# Patient Record
Sex: MALE | Race: WHITE | NOT HISPANIC OR LATINO | URBAN - METROPOLITAN AREA
[De-identification: names, ages, dates, MRNs, and addresses within clinical notes are randomized per-mention and may not be internally consistent; named-entity substitution may affect disease eponyms.]

---

## 2024-03-20 ENCOUNTER — INPATIENT (INPATIENT)
Facility: HOSPITAL | Age: 59
LOS: 0 days | Discharge: HOME | DRG: 301 | End: 2024-03-21
Attending: INTERNAL MEDICINE | Admitting: STUDENT IN AN ORGANIZED HEALTH CARE EDUCATION/TRAINING PROGRAM
Payer: COMMERCIAL

## 2024-03-20 VITALS
OXYGEN SATURATION: 96 % | HEART RATE: 75 BPM | DIASTOLIC BLOOD PRESSURE: 80 MMHG | TEMPERATURE: 98 F | SYSTOLIC BLOOD PRESSURE: 135 MMHG | RESPIRATION RATE: 18 BRPM

## 2024-03-20 LAB
ALBUMIN SERPL ELPH-MCNC: 3.6 G/DL — SIGNIFICANT CHANGE UP (ref 3.4–5)
ALP SERPL-CCNC: 79 U/L — SIGNIFICANT CHANGE UP (ref 40–120)
ALT FLD-CCNC: 26 U/L — SIGNIFICANT CHANGE UP (ref 12–42)
ANION GAP SERPL CALC-SCNC: 8 MMOL/L — LOW (ref 9–16)
APTT BLD: 32.9 SEC — SIGNIFICANT CHANGE UP (ref 24.5–35.6)
AST SERPL-CCNC: 18 U/L — SIGNIFICANT CHANGE UP (ref 15–37)
BASOPHILS # BLD AUTO: 0.02 K/UL — SIGNIFICANT CHANGE UP (ref 0–0.2)
BASOPHILS NFR BLD AUTO: 0.3 % — SIGNIFICANT CHANGE UP (ref 0–2)
BILIRUB SERPL-MCNC: 0.9 MG/DL — SIGNIFICANT CHANGE UP (ref 0.2–1.2)
BUN SERPL-MCNC: 14 MG/DL — SIGNIFICANT CHANGE UP (ref 7–23)
CALCIUM SERPL-MCNC: 8.7 MG/DL — SIGNIFICANT CHANGE UP (ref 8.5–10.5)
CHLORIDE SERPL-SCNC: 107 MMOL/L — SIGNIFICANT CHANGE UP (ref 96–108)
CO2 SERPL-SCNC: 27 MMOL/L — SIGNIFICANT CHANGE UP (ref 22–31)
CREAT SERPL-MCNC: 0.95 MG/DL — SIGNIFICANT CHANGE UP (ref 0.5–1.3)
EGFR: 92 ML/MIN/1.73M2 — SIGNIFICANT CHANGE UP
EOSINOPHIL # BLD AUTO: 0.12 K/UL — SIGNIFICANT CHANGE UP (ref 0–0.5)
EOSINOPHIL NFR BLD AUTO: 1.7 % — SIGNIFICANT CHANGE UP (ref 0–6)
GLUCOSE SERPL-MCNC: 92 MG/DL — SIGNIFICANT CHANGE UP (ref 70–99)
HCT VFR BLD CALC: 46.3 % — SIGNIFICANT CHANGE UP (ref 39–50)
HGB BLD-MCNC: 15.4 G/DL — SIGNIFICANT CHANGE UP (ref 13–17)
IMM GRANULOCYTES NFR BLD AUTO: 0.3 % — SIGNIFICANT CHANGE UP (ref 0–0.9)
INR BLD: 2.05 — HIGH (ref 0.85–1.18)
LYMPHOCYTES # BLD AUTO: 2.16 K/UL — SIGNIFICANT CHANGE UP (ref 1–3.3)
LYMPHOCYTES # BLD AUTO: 30 % — SIGNIFICANT CHANGE UP (ref 13–44)
MCHC RBC-ENTMCNC: 31 PG — SIGNIFICANT CHANGE UP (ref 27–34)
MCHC RBC-ENTMCNC: 33.3 GM/DL — SIGNIFICANT CHANGE UP (ref 32–36)
MCV RBC AUTO: 93.2 FL — SIGNIFICANT CHANGE UP (ref 80–100)
MONOCYTES # BLD AUTO: 0.88 K/UL — SIGNIFICANT CHANGE UP (ref 0–0.9)
MONOCYTES NFR BLD AUTO: 12.2 % — SIGNIFICANT CHANGE UP (ref 2–14)
NEUTROPHILS # BLD AUTO: 4 K/UL — SIGNIFICANT CHANGE UP (ref 1.8–7.4)
NEUTROPHILS NFR BLD AUTO: 55.5 % — SIGNIFICANT CHANGE UP (ref 43–77)
NRBC # BLD: 0 /100 WBCS — SIGNIFICANT CHANGE UP (ref 0–0)
PLATELET # BLD AUTO: 164 K/UL — SIGNIFICANT CHANGE UP (ref 150–400)
POTASSIUM SERPL-MCNC: 3.9 MMOL/L — SIGNIFICANT CHANGE UP (ref 3.5–5.3)
POTASSIUM SERPL-SCNC: 3.9 MMOL/L — SIGNIFICANT CHANGE UP (ref 3.5–5.3)
PROT SERPL-MCNC: 7.8 G/DL — SIGNIFICANT CHANGE UP (ref 6.4–8.2)
PROTHROM AB SERPL-ACNC: 22.1 SEC — HIGH (ref 9.5–13)
RBC # BLD: 4.97 M/UL — SIGNIFICANT CHANGE UP (ref 4.2–5.8)
RBC # FLD: 12.6 % — SIGNIFICANT CHANGE UP (ref 10.3–14.5)
SODIUM SERPL-SCNC: 142 MMOL/L — SIGNIFICANT CHANGE UP (ref 132–145)
WBC # BLD: 7.2 K/UL — SIGNIFICANT CHANGE UP (ref 3.8–10.5)
WBC # FLD AUTO: 7.2 K/UL — SIGNIFICANT CHANGE UP (ref 3.8–10.5)

## 2024-03-20 PROCEDURE — 93971 EXTREMITY STUDY: CPT | Mod: 26,LT

## 2024-03-20 PROCEDURE — 71275 CT ANGIOGRAPHY CHEST: CPT | Mod: 26,MC

## 2024-03-20 PROCEDURE — 99285 EMERGENCY DEPT VISIT HI MDM: CPT

## 2024-03-20 PROCEDURE — 73564 X-RAY EXAM KNEE 4 OR MORE: CPT | Mod: 26,LT

## 2024-03-20 RX ORDER — WARFARIN SODIUM 2.5 MG/1
7.5 TABLET ORAL ONCE
Refills: 0 | Status: COMPLETED | OUTPATIENT
Start: 2024-03-20 | End: 2024-03-20

## 2024-03-20 RX ADMIN — WARFARIN SODIUM 7.5 MILLIGRAM(S): 2.5 TABLET ORAL at 20:15

## 2024-03-20 NOTE — ED ADULT TRIAGE NOTE - CHIEF COMPLAINT QUOTE
pt walked in complaining of pain and swelling to the right knee s/p injury. pt concerned for dvt as he has a history. compliant with warfarin therapy.

## 2024-03-20 NOTE — ED PROVIDER NOTE - PROGRESS NOTE DETAILS
spoke with pt about DVT. he states that his last DVT was in 2016 and he has not had an ultrasound since then to show interval resolution, but he does recall that his previous clot was only in his calf and not behind his knee he states.

## 2024-03-20 NOTE — ED PROVIDER NOTE - OBJECTIVE STATEMENT
59-year-old male presents today with complaint of left-sided knee pain.  Patient states that he had a trip and fall and struck his knee a couple of days ago but it has become increasingly swollen and painful behind his left knee.  He has a history of previous DVT in the left calf and PE for which she is taking Coumadin.  His INR was last checked about a week ago and he states that it was around 3.2.  Since arriving in the emergency department he states that he has developed some shortness of breath, which she is not sure whether this could be a PE or whether he is just feeling anxious about his leg.  He has not missed any doses of his Coumadin.  He denies any numbness, tingling, weakness, fever, chills, rash.  He has been wearing his compression stockings.

## 2024-03-20 NOTE — ED ADULT NURSE NOTE - NSFALLUNIVINTERV_ED_ALL_ED
Bed/Stretcher in lowest position, wheels locked, appropriate side rails in place/Call bell, personal items and telephone in reach/Instruct patient to call for assistance before getting out of bed/chair/stretcher/Non-slip footwear applied when patient is off stretcher/[place] to call system/Physically safe environment - no spills, clutter or unnecessary equipment/Purposeful proactive rounding/Room/bathroom lighting operational, light cord in reach

## 2024-03-20 NOTE — ED PROVIDER NOTE - MUSCULOSKELETAL, MLM
Spine appears normal, range of motion is not limited, + some tenderness and fullness to the popliteal fossa on the left knee, varicose veins noted, 2+ dp/pt pulses equal bilat, sensation intact distally, cap refill < 2 seconds

## 2024-03-20 NOTE — ED PROVIDER NOTE - CLINICAL SUMMARY MEDICAL DECISION MAKING FREE TEXT BOX
Patient with history of DVT and PE presents today with complaint of left calf and knee pain and swelling.  His previous DVT was in his left calf but did not include the knee.  He states the pain feels similar to his previous DVT and he does not typically have any discomfort in this area.  His last ultrasound study was in 2016 and he has been taking Coumadin ever since then.  Since arriving in the ED he states that he has experienced some shortness of breath, which was not present when he originally arrived here, and he is not sure whether he is anxious or may be has a PE as well.  Will obtain ultrasound of the extremity PE study labs and coags. Patient with history of DVT and PE presents today with complaint of left calf and knee pain and swelling.  His previous DVT was in his left calf but did not include the knee.  He states the pain feels similar to his previous DVT and he does not typically have any discomfort in this area.  His last ultrasound study was in 2016 and he has been taking Coumadin ever since then.  Since arriving in the ED he states that he has experienced some shortness of breath, which was not present when he originally arrived here, and he is not sure whether he is anxious or may be has a PE as well.  Will obtain ultrasound of the extremity PE study labs and coags.    previous DVT is from 2016 and involved only the calf vein. current US shows new DVT including both calf and popliteal veins which are exactly where pt has pain. will admit - d/w Dr. Jovanni Reddy who recommends giving his evening dose of coumadin (pt takes 7.5mg MWF and 5mg THSS) and they will recheck his INR on arrival and adjust medications as needed.

## 2024-03-21 ENCOUNTER — TRANSCRIPTION ENCOUNTER (OUTPATIENT)
Age: 59
End: 2024-03-21

## 2024-03-21 VITALS
DIASTOLIC BLOOD PRESSURE: 62 MMHG | TEMPERATURE: 98 F | HEART RATE: 50 BPM | RESPIRATION RATE: 18 BRPM | SYSTOLIC BLOOD PRESSURE: 106 MMHG | OXYGEN SATURATION: 95 %

## 2024-03-21 DIAGNOSIS — M25.562 PAIN IN LEFT KNEE: ICD-10-CM

## 2024-03-21 DIAGNOSIS — Z29.9 ENCOUNTER FOR PROPHYLACTIC MEASURES, UNSPECIFIED: ICD-10-CM

## 2024-03-21 DIAGNOSIS — I80.229 PHLEBITIS AND THROMBOPHLEBITIS OF UNSPECIFIED POPLITEAL VEIN: ICD-10-CM

## 2024-03-21 DIAGNOSIS — I82.409 ACUTE EMBOLISM AND THROMBOSIS OF UNSPECIFIED DEEP VEINS OF UNSPECIFIED LOWER EXTREMITY: ICD-10-CM

## 2024-03-21 DIAGNOSIS — Z90.49 ACQUIRED ABSENCE OF OTHER SPECIFIED PARTS OF DIGESTIVE TRACT: Chronic | ICD-10-CM

## 2024-03-21 DIAGNOSIS — F41.9 ANXIETY DISORDER, UNSPECIFIED: ICD-10-CM

## 2024-03-21 LAB
ANION GAP SERPL CALC-SCNC: 9 MMOL/L — SIGNIFICANT CHANGE UP (ref 5–17)
APTT BLD: 32.5 SEC — SIGNIFICANT CHANGE UP (ref 24.5–35.6)
BUN SERPL-MCNC: 14 MG/DL — SIGNIFICANT CHANGE UP (ref 7–23)
CALCIUM SERPL-MCNC: 9.7 MG/DL — SIGNIFICANT CHANGE UP (ref 8.4–10.5)
CHLORIDE SERPL-SCNC: 106 MMOL/L — SIGNIFICANT CHANGE UP (ref 96–108)
CO2 SERPL-SCNC: 26 MMOL/L — SIGNIFICANT CHANGE UP (ref 22–31)
CREAT SERPL-MCNC: 0.9 MG/DL — SIGNIFICANT CHANGE UP (ref 0.5–1.3)
EGFR: 98 ML/MIN/1.73M2 — SIGNIFICANT CHANGE UP
GLUCOSE SERPL-MCNC: 94 MG/DL — SIGNIFICANT CHANGE UP (ref 70–99)
HCT VFR BLD CALC: 43.4 % — SIGNIFICANT CHANGE UP (ref 39–50)
HCV AB S/CO SERPL IA: 0.04 S/CO — SIGNIFICANT CHANGE UP
HCV AB SERPL-IMP: SIGNIFICANT CHANGE UP
HGB BLD-MCNC: 14.5 G/DL — SIGNIFICANT CHANGE UP (ref 13–17)
INR BLD: 1.77 — HIGH (ref 0.85–1.18)
MAGNESIUM SERPL-MCNC: 2.2 MG/DL — SIGNIFICANT CHANGE UP (ref 1.6–2.6)
MCHC RBC-ENTMCNC: 31.3 PG — SIGNIFICANT CHANGE UP (ref 27–34)
MCHC RBC-ENTMCNC: 33.4 GM/DL — SIGNIFICANT CHANGE UP (ref 32–36)
MCV RBC AUTO: 93.5 FL — SIGNIFICANT CHANGE UP (ref 80–100)
NRBC # BLD: 0 /100 WBCS — SIGNIFICANT CHANGE UP (ref 0–0)
PHOSPHATE SERPL-MCNC: 2.9 MG/DL — SIGNIFICANT CHANGE UP (ref 2.5–4.5)
PLATELET # BLD AUTO: 167 K/UL — SIGNIFICANT CHANGE UP (ref 150–400)
POTASSIUM SERPL-MCNC: 3.8 MMOL/L — SIGNIFICANT CHANGE UP (ref 3.5–5.3)
POTASSIUM SERPL-SCNC: 3.8 MMOL/L — SIGNIFICANT CHANGE UP (ref 3.5–5.3)
PROTHROM AB SERPL-ACNC: 19.8 SEC — HIGH (ref 9.5–13)
RBC # BLD: 4.64 M/UL — SIGNIFICANT CHANGE UP (ref 4.2–5.8)
RBC # FLD: 12.7 % — SIGNIFICANT CHANGE UP (ref 10.3–14.5)
SODIUM SERPL-SCNC: 141 MMOL/L — SIGNIFICANT CHANGE UP (ref 135–145)
WBC # BLD: 6.06 K/UL — SIGNIFICANT CHANGE UP (ref 3.8–10.5)
WBC # FLD AUTO: 6.06 K/UL — SIGNIFICANT CHANGE UP (ref 3.8–10.5)

## 2024-03-21 PROCEDURE — 36415 COLL VENOUS BLD VENIPUNCTURE: CPT

## 2024-03-21 PROCEDURE — 85027 COMPLETE CBC AUTOMATED: CPT

## 2024-03-21 PROCEDURE — 73564 X-RAY EXAM KNEE 4 OR MORE: CPT

## 2024-03-21 PROCEDURE — 84100 ASSAY OF PHOSPHORUS: CPT

## 2024-03-21 PROCEDURE — 71275 CT ANGIOGRAPHY CHEST: CPT | Mod: MC

## 2024-03-21 PROCEDURE — 80053 COMPREHEN METABOLIC PANEL: CPT

## 2024-03-21 PROCEDURE — 86803 HEPATITIS C AB TEST: CPT

## 2024-03-21 PROCEDURE — 99285 EMERGENCY DEPT VISIT HI MDM: CPT

## 2024-03-21 PROCEDURE — 85025 COMPLETE CBC W/AUTO DIFF WBC: CPT

## 2024-03-21 PROCEDURE — 85610 PROTHROMBIN TIME: CPT

## 2024-03-21 PROCEDURE — 93971 EXTREMITY STUDY: CPT

## 2024-03-21 PROCEDURE — 99236 HOSP IP/OBS SAME DATE HI 85: CPT | Mod: GC

## 2024-03-21 PROCEDURE — 83735 ASSAY OF MAGNESIUM: CPT

## 2024-03-21 PROCEDURE — 85730 THROMBOPLASTIN TIME PARTIAL: CPT

## 2024-03-21 PROCEDURE — 80048 BASIC METABOLIC PNL TOTAL CA: CPT

## 2024-03-21 RX ORDER — WARFARIN SODIUM 2.5 MG/1
1 TABLET ORAL
Refills: 0 | DISCHARGE

## 2024-03-21 RX ORDER — ACETAMINOPHEN 500 MG
975 TABLET ORAL EVERY 8 HOURS
Refills: 0 | Status: DISCONTINUED | OUTPATIENT
Start: 2024-03-21 | End: 2024-03-21

## 2024-03-21 RX ORDER — APIXABAN 2.5 MG/1
5 TABLET, FILM COATED ORAL EVERY 12 HOURS
Refills: 0 | Status: DISCONTINUED | OUTPATIENT
Start: 2024-03-21 | End: 2024-03-21

## 2024-03-21 RX ORDER — APIXABAN 2.5 MG/1
1 TABLET, FILM COATED ORAL
Qty: 30 | Refills: 0
Start: 2024-03-21 | End: 2024-04-19

## 2024-03-21 RX ORDER — ONDANSETRON 8 MG/1
4 TABLET, FILM COATED ORAL EVERY 8 HOURS
Refills: 0 | Status: DISCONTINUED | OUTPATIENT
Start: 2024-03-21 | End: 2024-03-21

## 2024-03-21 RX ORDER — LANOLIN ALCOHOL/MO/W.PET/CERES
3 CREAM (GRAM) TOPICAL AT BEDTIME
Refills: 0 | Status: DISCONTINUED | OUTPATIENT
Start: 2024-03-21 | End: 2024-03-21

## 2024-03-21 RX ORDER — ACETAMINOPHEN 500 MG
650 TABLET ORAL EVERY 6 HOURS
Refills: 0 | Status: DISCONTINUED | OUTPATIENT
Start: 2024-03-21 | End: 2024-03-21

## 2024-03-21 RX ADMIN — Medication 975 MILLIGRAM(S): at 08:25

## 2024-03-21 RX ADMIN — Medication 7.5 MILLIGRAM(S): at 11:36

## 2024-03-21 RX ADMIN — Medication 10 MILLIGRAM(S): at 02:29

## 2024-03-21 RX ADMIN — Medication 975 MILLIGRAM(S): at 07:25

## 2024-03-21 NOTE — H&P ADULT - ASSESSMENT
Patient is a 60yo M with PMH of DVT/PE in 2016 (on Coumadin) and anxiety who presents with left knee pain x 3 days, found to have a nonocclusive DVT of the left popliteal vein and branch to gastrocnemius.

## 2024-03-21 NOTE — DISCHARGE NOTE PROVIDER - NSDCMRMEDTOKEN_GEN_ALL_CORE_FT
BuSpar 10 mg oral tablet: 1 tab(s) orally once a day (at bedtime)  busPIRone 7.5 mg oral tablet: 1 tab(s) orally once a day (in the morning)  Eliquis Starter Pack for Treatment of DVT and PE 5 mg oral tablet: 1 tab(s) orally once a day 10 mg BID for 7 days and then 5 mg BID after that.

## 2024-03-21 NOTE — H&P ADULT - PROBLEM SELECTOR PLAN 1
Patient with pain to the left knee starting 3 days ago, now with swelling. Reports he has been less mobile over the past couple of days. Hx of DVT/PE in 2016 after international flight, started on coumadin over eliquis due to financial reasons, however has been continued on it indefinitely. No clear diagnosis of autoimmune/clotting disorder. Does not miss doses of coumadin, takes 7.5mg MWF and 5mg TRSS. INR checks at home usually between 2-2.5. Does not actively follow with hematologist. No history of valve replacements. US Duplex here showing nonocclusive DVT in the L popliteal vein and one of the branches to the gastrocnemius. Given one dose of home coumadin 7.5mg in the ED. CT PE negative.   - check AM INR  - if therapeutic (INR 2-3), consider treatment failure; may need to switch to DOAC v lovenox  - consider heme/onc consult in the AM v. outpatient follow up

## 2024-03-21 NOTE — H&P ADULT - ATTENDING COMMENTS
Acute Unprovoked DVT   - Start Eliquis as patient had DVT while on coumadin     He had ATIII , APS , PT gene Factor , Protein C and S , MHTFR Gene mutation testing was negative in 206

## 2024-03-21 NOTE — DISCHARGE NOTE PROVIDER - NSDCCPTREATMENT_GEN_ALL_CORE_FT
PRINCIPAL PROCEDURE  Procedure: Duplex scan of veins of lower extremity  Findings and Treatment: Nonocclusive deep venous thrombosis in the left popliteal vein and one of   the branches to the gastrocnemius.

## 2024-03-21 NOTE — PATIENT PROFILE ADULT - FALL HARM RISK - HARM RISK INTERVENTIONS

## 2024-03-21 NOTE — H&P ADULT - PROBLEM SELECTOR PLAN 2
Patient c/o pain to the left knee that started 3 days ago after mild trauma. HR showing no evidence of fracture, but small to moderate knee joint effusion present mod patellofemoral compartment arthrosis.   - tylenol 975mg q8h standing

## 2024-03-21 NOTE — DISCHARGE NOTE NURSING/CASE MANAGEMENT/SOCIAL WORK - PATIENT PORTAL LINK FT
You can access the FollowMyHealth Patient Portal offered by  by registering at the following website: [email or web address]. By joining [facility]’s FollowMyHealth portal, you will also be able to view your health information using other applications (apps) compatible with our system.

## 2024-03-21 NOTE — DISCHARGE NOTE PROVIDER - NSDCCPCAREPLAN_GEN_ALL_CORE_FT
PRINCIPAL DISCHARGE DIAGNOSIS  Diagnosis: DVT, lower extremity  Assessment and Plan of Treatment: You have a history of blood clots forming in your right leg. This is dangerous because the clot can travel from your legs into your lungs, causing a sudden blockage of an artery and making difficult for you to breathe. In order to prevent this from happening, it is important that you take your blood thinner medication everyday and avoid laying/sitting for prolonged periods of time. Please see your primary care physician in 1-2 weeks. If you happen to experience sudden shortness of breath, chest pain, or a fast beating heart, please return to the emergency department for interval evaluation of your deep vein thrombosis (DVT).

## 2024-03-21 NOTE — H&P ADULT - HISTORY OF PRESENT ILLNESS
HPI: Patient is a 60yo M with PMH of DVT/PE in 2016 (on Coumadin) and anxiety who presents with left knee pain x 3 days. He states he was bumped into [place]. At the time he felt like he had a strain to the knee, so has been resting more so than usual (at baseline he is very active and rides the elliptical everyday). Yesterday, he noticed increased pain and felt his left knee was more swollen. Described the pain as a dull, deep pain behind his knee which prevents him from flexing his knee or bearing weight. He was concerned as her experienced similar pain previously in 2016 when he was diagnosed with a L calf vein PE. Reports he developed a DVT/PE after flying to [place] for vacation. He was started on coumadin at the time (states he was also offered eliquis but elected to start coumadin due to financial reasons). He has never had a repeat scan but was told he had to remain on lifelong coumadin. He states he was tested for clotting disorders but was not given an official diagnosis. He thinks one of the tests may have been abnormal. Has not seen a hematologist in a long time but does test his INR at home, usually between 2-2.5. Currently takes coumadin 7.5mg on M/W/F and 5mg on T/R/S/S.    In the ED:  Initial vital signs: T: XX F, HR: XX, BP: XX, R: XX, SpO2: XX% on RA  Labs: significant for  CXR:   EKG:   Medications:   Consults: none  HPI: Patient is a 58yo M with PMH of DVT/PE in 2016 (on Coumadin) and anxiety who presents with left knee pain x 3 days. He states he was bumped into [place]. At the time he felt like he had a strain to the knee, so has been resting more so than usual (at baseline he is very active and rides the elliptical everyday). Yesterday, he noticed increased pain and felt his left knee was more swollen. Described the pain as a dull, deep pain behind his knee which prevents him from flexing his knee or bearing weight. He was concerned as her experienced similar pain previously in 2016 when he was diagnosed with a L calf vein PE. Reports he developed a DVT/PE after flying to [place] for vacation. He was started on coumadin at the time (states he was also offered eliquis but elected to start coumadin due to financial reasons). He has never had a repeat scan but was told he had to remain on lifelong coumadin. He states he was tested for clotting disorders but was not given an official diagnosis. He thinks one of the tests may have been abnormal. Has not seen a hematologist in a long time but does test his INR at home, usually between 2-2.5. Currently takes coumadin 7.5mg on M/W/F and 5mg on T/R/S/S. Denies chest pain, SOB, N/V,     In the ED:  Initial vital signs: T: XX F, HR: XX, BP: XX, R: XX, SpO2: XX% on RA  Labs: significant for  CXR:   EKG:   Medications:   Consults: none  HPI: Patient is a 58yo M with PMH of DVT/PE in 2016 (on Coumadin) and anxiety who presents with left knee pain x 3 days. He states he was bumped into [place]. At the time he felt like he had a strain to the knee, so has been resting more so than usual (at baseline he is very active and rides the elliptical everyday). Yesterday, he noticed increased pain and felt his left knee was more swollen. Described the pain as a dull, deep pain behind his knee which prevents him from flexing his knee or bearing weight. He was concerned as her experienced similar pain previously in 2016 when he was diagnosed with a L calf vein PE. Reports he developed a DVT/PE after flying to [place] for vacation. He was started on coumadin at the time (states he was also offered eliquis but elected to start coumadin due to financial reasons). He has never had a repeat scan but was told he had to remain on lifelong coumadin. He states he was tested for clotting disorders but was not given an official diagnosis. He thinks one of the tests may have been abnormal. Has not seen a hematologist in a long time but does test his INR at home, usually between 2-2.5. Currently takes coumadin 7.5mg on M/W/F and 5mg on T/R/S/S, extremely compliant, does not miss doses. Denies chest pain, SOB, N/V, abdominal pain, diarrhea, recent fevers/chills.     In the ED:  Initial vital signs: T: 98.5 F, HR: 75, BP: 135/80, R: 18, SpO2: 96% on RA  Labs: significant for PT 22.1, INR 2.05  CT PE: No pulmonary embolism. Clear lungs.  US Duplex Venous LE: Nonocclusive deep venous thrombosis in the left popliteal vein and one of the branches to the gastrocnemius.  XR L Knee: There is no evidence of acute fracture or dislocation. There is a small to moderate knee joint effusion. Moderate patellofemoral compartment arthrosis.  EKG:   Medications:   Consults: none  HPI: Patient is a 60yo M with PMH of DVT/PE in 2016 (on Coumadin) and anxiety who presents with left knee pain x 3 days. He states he was bumped into [place]. At the time he felt like he had a strain to the knee, so has been resting more so than usual (at baseline he is very active and rides the elliptical everyday). Yesterday, he noticed increased pain and felt his left knee was more swollen. Described the pain as a dull, deep pain behind his knee which prevents him from flexing his knee or bearing weight. He was concerned as her experienced similar pain previously in 2016 when he was diagnosed with a L calf vein PE. Reports he developed a DVT/PE after flying to [place] for vacation. He was started on coumadin at the time (states he was also offered eliquis but elected to start coumadin due to financial reasons). He has never had a repeat scan but was told he had to remain on lifelong coumadin. He states he was tested for clotting disorders but was not given an official diagnosis. He thinks one of the tests may have been abnormal. Has not seen a hematologist in a long time but does test his INR at home, usually between 2-2.5. Currently takes coumadin 7.5mg on M/W/F and 5mg on T/R/S/S, extremely compliant, does not miss doses. Wears compression stocking on the left lower extremity. Denies chest pain, SOB, N/V, abdominal pain, diarrhea, recent fevers/chills.     In the ED:  Initial vital signs: T: 98.5 F, HR: 75, BP: 135/80, R: 18, SpO2: 96% on RA  Labs: significant for PT 22.1, INR 2.05  CT PE: No pulmonary embolism. Clear lungs.  US Duplex Venous LE: Nonocclusive deep venous thrombosis in the left popliteal vein and one of the branches to the gastrocnemius.  XR L Knee: There is no evidence of acute fracture or dislocation. There is a small to moderate knee joint effusion. Moderate patellofemoral compartment arthrosis.  EKG: pending  Medications: warfarin 7.5mg PO x 1  Consults: PERT HPI: Patient is a 58yo M with PMH of DVT/PE in 2016 (on Coumadin) and anxiety who presents with left knee pain x 3 days. He states he was bumped into [place]. At the time he felt like he had a strain to the knee, so has been resting more so than usual (at baseline he is very active and rides the elliptical everyday). Yesterday, he noticed increased pain and felt his left knee was more swollen. Described the pain as a dull, deep pain behind his knee which prevents him from flexing his knee or bearing weight. He was concerned as her experienced similar pain previously in 2016 when he was diagnosed with a L calf vein PE. Reports he developed a DVT/PE after flying to [place] for vacation. He was started on coumadin at the time (states he was also offered eliquis but elected to start coumadin due to financial reasons). He has never had a repeat scan but was told he had to remain on lifelong coumadin. He states he was tested for clotting disorders but was not given an official diagnosis. He thinks one of the tests may have been abnormal. No personal hx of cancer. Has not seen a hematologist in a long time but does test his INR at home, usually between 2-2.5. Currently takes coumadin 7.5mg on M/W/F and 5mg on T/R/S/S, extremely compliant, does not miss doses. Wears compression stocking on the left lower extremity. Denies chest pain, SOB, N/V, abdominal pain, diarrhea, recent fevers/chills.     In the ED:  Initial vital signs: T: 98.5 F, HR: 75, BP: 135/80, R: 18, SpO2: 96% on RA  Labs: significant for PT 22.1, INR 2.05  CT PE: No pulmonary embolism. Clear lungs.  US Duplex Venous LE: Nonocclusive deep venous thrombosis in the left popliteal vein and one of the branches to the gastrocnemius.  XR L Knee: There is no evidence of acute fracture or dislocation. There is a small to moderate knee joint effusion. Moderate patellofemoral compartment arthrosis.  EKG: pending  Medications: warfarin 7.5mg PO x 1  Consults: none

## 2024-03-21 NOTE — DISCHARGE NOTE PROVIDER - HOSPITAL COURSE
Patient is a 60yo M with PMH of DVT/PE in 2016 (on Coumadin) and anxiety who presents with left knee pain x 3 days, found to have a nonocclusive DVT of the left popliteal vein and branch to gastrocnemius.     Hospital course:    #DVT of leg (deep venous thrombosis).   Patient with pain to the left knee starting 3 days ago, now with swelling. Reports he has been less mobile over the past couple of days. Hx of DVT/PE in 2016 after international flight, started on coumadin over eliquis due to financial reasons, however has been continued on it indefinitely. No clear diagnosis of autoimmune/clotting disorder. Does not miss doses of coumadin, takes 7.5mg MWF and 5mg TRSS. INR checks at home usually between 2-2.5. Does not actively follow with hematologist. No history of valve replacements. US Duplex here showing nonocclusive DVT in the L popliteal vein and one of the branches to the gastrocnemius. Given one dose of home coumadin 7.5mg in the ED. CT PE negative.   - INR 1.77 on 3/21  - Per review of pt's old records, prior hypercoagulable work up negative.   - Switching to Eliquis (starter pack)  - Follow up with PCP outpatient    #Knee pain, left.   Patient c/o pain to the left knee that started 3 days ago after mild trauma. HR showing no evidence of fracture, but small to moderate knee joint effusion present mod patellofemoral compartment arthrosis.   - tylenol 975mg q8h standing.    #Anxiety.   Patient on Buspar for anxiety, takes 7.5mg in the AM and 10mg in the PM. Well controlled.   - c/w home meds.    Patient was discharged to: Home     New medications: Eliquis starter pack     Items to follow up as outpatient: f/u with PCP     Physical exam at the time of discharge:     Constitutional: resting comfortably in bed; NAD  Head: NC/AT  Eyes: PERRL, EOMI, anicteric sclera  ENT: no nasal discharge; MMM  Neck: supple; no JVD or thyromegaly  Respiratory: CTA B/L; no W/R/R, no retractions  Cardiac: +S1/S2; RRR; no M/R/G  Gastrointestinal: abdomen soft, NT/ND; no rebound or guarding; +BSx4  Extremities: WWP, no clubbing or cyanosis; left knee anterior and posteromedial aspect swollen when compared to right  Musculoskeletal: NROM x3 B/L UE and RLE however limited flexion of L leg at the knee  Vascular: 2+ radial, DP/PT pulses B/L  Neurologic: AAOx3;   Psychiatric: affect and characteristics of appearance, verbalizations, behaviors are appropriate

## 2024-03-21 NOTE — PATIENT PROFILE ADULT - STATED REASON FOR ADMISSION
pt almost fell going down steps and noticed pain and inflammation on L knee. called PCP and was instructed to come to ER.

## 2024-03-21 NOTE — H&P ADULT - NSHPPHYSICALEXAM_GEN_ALL_CORE
.  VITAL SIGNS:  T(C): 36.7 (03-21-24 @ 00:55), Max: 36.9 (03-20-24 @ 15:50)  T(F): 98.1 (03-21-24 @ 00:55), Max: 98.5 (03-20-24 @ 15:50)  HR: 58 (03-21-24 @ 00:55) (58 - 75)  BP: 142/84 (03-21-24 @ 00:55) (110/71 - 142/84)  BP(mean): --  RR: 18 (03-21-24 @ 00:55) (18 - 18)  SpO2: 97% (03-21-24 @ 00:55) (96% - 100%)  Wt(kg): --    PHYSICAL EXAM:    Constitutional: resting comfortably in bed; NAD  Head: NC/AT  Eyes: PERRL, EOMI, anicteric sclera  ENT: no nasal discharge; MMM  Neck: supple; no JVD or thyromegaly  Respiratory: CTA B/L; no W/R/R, no retractions  Cardiac: +S1/S2; RRR; no M/R/G  Gastrointestinal: abdomen soft, NT/ND; no rebound or guarding; +BSx4  Extremities: WWP, no clubbing or cyanosis; left knee anterior and posteromedial aspect swollen when compared to right  Musculoskeletal: NROM x43 however limited flexion of L leg at the knee; no joint swelling, tenderness or erythema  Vascular: 2+ radial, DP/PT pulses B/L  Dermatologic: skin warm, dry and intact; no rashes, wounds, or scars  Neurologic: AAOx3; CNII-XII grossly intact; no focal deficits  Psychiatric: affect and characteristics of appearance, verbalizations, behaviors are appropriate .  VITAL SIGNS:  T(C): 36.7 (03-21-24 @ 00:55), Max: 36.9 (03-20-24 @ 15:50)  T(F): 98.1 (03-21-24 @ 00:55), Max: 98.5 (03-20-24 @ 15:50)  HR: 58 (03-21-24 @ 00:55) (58 - 75)  BP: 142/84 (03-21-24 @ 00:55) (110/71 - 142/84)  BP(mean): --  RR: 18 (03-21-24 @ 00:55) (18 - 18)  SpO2: 97% (03-21-24 @ 00:55) (96% - 100%)  Wt(kg): --    PHYSICAL EXAM:    Constitutional: resting comfortably in bed; NAD  Head: NC/AT  Eyes: PERRL, EOMI, anicteric sclera  ENT: no nasal discharge; MMM  Neck: supple; no JVD or thyromegaly  Respiratory: CTA B/L; no W/R/R, no retractions  Cardiac: +S1/S2; RRR; no M/R/G  Gastrointestinal: abdomen soft, NT/ND; no rebound or guarding; +BSx4  Extremities: WWP, no clubbing or cyanosis; left knee anterior and posteromedial aspect swollen when compared to right  Musculoskeletal: NROM x3 B/L UE and RLE however limited flexion of L leg at the knee  Vascular: 2+ radial, DP/PT pulses B/L  Dermatologic: skin warm, dry and intact; no rashes, wounds, or scars  Neurologic: AAOx3; CNII-XII grossly intact; no focal deficits  Psychiatric: affect and characteristics of appearance, verbalizations, behaviors are appropriate

## 2024-03-21 NOTE — H&P ADULT - NSHPLABSRESULTS_GEN_ALL_CORE
15.4   7.20  )-----------( 164      ( 20 Mar 2024 16:53 )             46.3       03-20    142  |  107  |  14  ----------------------------<  92  3.9   |  27  |  0.95    Ca    8.7      20 Mar 2024 16:53    TPro  7.8  /  Alb  3.6  /  TBili  0.9  /  DBili  x   /  AST  18  /  ALT  26  /  AlkPhos  79  03-20              Urinalysis Basic - ( 20 Mar 2024 16:53 )    Color: x / Appearance: x / SG: x / pH: x  Gluc: 92 mg/dL / Ketone: x  / Bili: x / Urobili: x   Blood: x / Protein: x / Nitrite: x   Leuk Esterase: x / RBC: x / WBC x   Sq Epi: x / Non Sq Epi: x / Bacteria: x        PT/INR - ( 20 Mar 2024 16:53 )   PT: 22.1 sec;   INR: 2.05          PTT - ( 20 Mar 2024 16:53 )  PTT:32.9 sec    Lactate Trend            CAPILLARY BLOOD GLUCOSE

## 2024-03-21 NOTE — DISCHARGE NOTE PROVIDER - CARE PROVIDER_API CALL
Carlie Mcknight  Cardiovascular Disease  [address] [address] [place] [address], [address] [place] [address]  Phone: [phone]  Fax: [phone]  Follow Up Time: 1 week

## 2024-03-21 NOTE — H&P ADULT - PROBLEM SELECTOR PLAN 3
Patient on Buspar for anxiety, takes 7.5mg in the AM and 10mg in the PM. Well controlled.   - c/w home meds

## 2024-03-21 NOTE — H&P ADULT - NSHPSOCIALHISTORY_GEN_ALL_CORE
Denies drug or tobacco use. Occasionally drinks a glass of wine. 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

## 2024-03-21 NOTE — H&P ADULT - PROBLEM SELECTOR PLAN 4
F: none  E: replete as needed  N: regualr  DVT ppx: as above  Activity: ambulate as tolerated  Dispo: F

## 2024-03-29 DIAGNOSIS — M17.12 UNILATERAL PRIMARY OSTEOARTHRITIS, LEFT KNEE: ICD-10-CM

## 2024-03-29 DIAGNOSIS — I82.432 ACUTE EMBOLISM AND THROMBOSIS OF LEFT POPLITEAL VEIN: ICD-10-CM

## 2024-03-29 DIAGNOSIS — Z86.711 PERSONAL HISTORY OF PULMONARY EMBOLISM: ICD-10-CM

## 2024-03-29 DIAGNOSIS — Z79.01 LONG TERM (CURRENT) USE OF ANTICOAGULANTS: ICD-10-CM

## 2024-03-29 DIAGNOSIS — Z91.81 HISTORY OF FALLING: ICD-10-CM

## 2024-03-29 DIAGNOSIS — Z79.899 OTHER LONG TERM (CURRENT) DRUG THERAPY: ICD-10-CM

## 2024-03-29 DIAGNOSIS — F41.9 ANXIETY DISORDER, UNSPECIFIED: ICD-10-CM

## 2024-03-29 DIAGNOSIS — Z90.49 ACQUIRED ABSENCE OF OTHER SPECIFIED PARTS OF DIGESTIVE TRACT: ICD-10-CM

## 2024-03-29 DIAGNOSIS — I82.462 ACUTE EMBOLISM AND THROMBOSIS OF LEFT CALF MUSCULAR VEIN: ICD-10-CM

## 2024-03-29 DIAGNOSIS — M25.462 EFFUSION, LEFT KNEE: ICD-10-CM

## 2024-04-18 PROBLEM — I82.409 ACUTE EMBOLISM AND THROMBOSIS OF UNSPECIFIED DEEP VEINS OF UNSPECIFIED LOWER EXTREMITY: Chronic | Status: ACTIVE | Noted: 2024-03-20

## 2024-04-18 PROBLEM — I26.99 OTHER PULMONARY EMBOLISM WITHOUT ACUTE COR PULMONALE: Chronic | Status: ACTIVE | Noted: 2024-03-20

## 2024-04-25 ENCOUNTER — APPOINTMENT (OUTPATIENT)
Dept: VASCULAR SURGERY | Facility: CLINIC | Age: 59
End: 2024-04-25
Payer: COMMERCIAL

## 2024-04-25 DIAGNOSIS — I82.409 ACUTE EMBOLISM AND THROMBOSIS OF UNSPECIFIED DEEP VEINS OF UNSPECIFIED LOWER EXTREMITY: ICD-10-CM

## 2024-04-25 DIAGNOSIS — O22.30 DEEP PHLEBOTHROMBOSIS IN PREGNANCY, UNSPECIFIED TRIMESTER: ICD-10-CM

## 2024-04-25 PROCEDURE — 93971 EXTREMITY STUDY: CPT

## 2024-04-25 PROCEDURE — 99204 OFFICE O/P NEW MOD 45 MIN: CPT

## 2024-04-26 DIAGNOSIS — Z91.89 OTHER SPECIFIED PERSONAL RISK FACTORS, NOT ELSEWHERE CLASSIFIED: ICD-10-CM

## 2024-04-26 NOTE — REVIEW OF SYSTEMS
[Negative] : Heme/Lymph [As Noted in HPI] : as noted in HPI [Limb Pain] : limb pain [Chest Pain] : no chest pain [Shortness Of Breath] : no shortness of breath [Limb Swelling] : no limb swelling

## 2024-04-26 NOTE — PROCEDURE
[FreeTextEntry1] : LLE venous doppler was done in the office that demonstrated chronic changes in distal FV, popliteal and one gastrocnemius veins.

## 2024-04-26 NOTE — HISTORY OF PRESENT ILLNESS
[FreeTextEntry1] : 59yoM with PMH of DVT/PE in 2016 previously on Coumadin for years who was admitted in March with left knee pain x 3 days, found to have a nonocclusive DVT of the left popliteal vein and branch to gastrocnemius. He was discharged on Eliquis and presents today for an evaluation. He completed a starter pack of Eliquis and stopped it 4 days ago. Patient was seen by a hematologist years ago; however it is unclear if he was diagnosed with an underlying coagulopathy. The DVT in 2016 was after a flight to [place].  He states that he continues to experience slight discomfort in his leg but denies leg pain or edema.  Of note, patient is known to the practice, was seen by Dr. Arevalo in 2016 and was noted to have L GSV reflux and varicose veins.

## 2024-04-26 NOTE — PHYSICAL EXAM
[Respiratory Effort] : normal respiratory effort [Normal Heart Sounds] : normal heart sounds [2+] : left 2+ [Alert] : alert [Calm] : calm [Abdomen Tenderness] : ~T ~M No abdominal tenderness [de-identified] : WN/WD, NAD [de-identified] : NC/AT [de-identified] : supple [de-identified] : +FROM 5/5x4

## 2024-04-26 NOTE — ASSESSMENT
[Arterial/Venous Disease] : arterial/venous disease [FreeTextEntry1] : 59yoM with PMH of DVT/PE in 2016 (on Coumadin) and anxiety who was admitted in March with left knee pain x 3 days, found to have a nonocclusive DVT of the left popliteal vein and branch to gastrocnemius. He feels good, however stopped Eliquis 4 days ago after he completed the starter pack that was prescribed in ED. On exam, both legs are well perfused, no calf tenderness, palpable peripheral pulses. LLE venous doppler was done in the office that demonstrated chronic changes in distal FV, popliteal and one gastrocnemius veins. We discussed the findings and recommended to start Eliquis, Rx was sent to his pharmacy. We also referred him to a hematologist, Dr. Queen. F/u in 3 months for surveillance US.

## 2024-04-26 NOTE — ADDENDUM
[FreeTextEntry1] : This note was written by Theodora ISSA, acting as a scribe for Dr. Tahir Lazcano.  I, Dr. Tahir Lazcano, have read and attest that all the information, medical decision-making, and discharge instructions within are true and accurate.  I agree with the above. Mr. Galo Ceballos is a 59M w/ prior DVT/PE in 2016 and was Rx'ed Coumadin, who recently developed non-occlusive DVT of L popltieal delroy and branch to gastrocnemius in 3/2024. It appears he was not taking AC at the time? Regardless he was prescribed Eliquis and presents for vascular follow up today.  He has no major complaints of his legs. Today venous duplex US demonstrated chronic changes in distal FV, popliteal and one gastrocnemius veins. Would continue Eliquis for at least 3 months at which point will plan for interval duplex US. Will also refer to heme to determine if has hypercoagulable condition and if needs to be on AC longer/lifelong.    I, Dr. Tahir Lazcano, personally performed the evaluation and management (E/M) services for this new patient.  That E/M includes conducting the initial examination, assessing all conditions, and establishing the plan of care.  Today, my ACP, Theodora ISSA, was here to observe my evaluation and management services for this patient to be followed going forward.

## 2024-04-29 RX ORDER — APIXABAN 5 MG/1
5 TABLET, FILM COATED ORAL
Qty: 60 | Refills: 2 | Status: ACTIVE | COMMUNITY
Start: 2024-04-25 | End: 1900-01-01

## 2024-07-25 ENCOUNTER — APPOINTMENT (OUTPATIENT)
Dept: VASCULAR SURGERY | Facility: CLINIC | Age: 59
End: 2024-07-25
Payer: COMMERCIAL

## 2024-07-25 VITALS
SYSTOLIC BLOOD PRESSURE: 137 MMHG | DIASTOLIC BLOOD PRESSURE: 81 MMHG | HEART RATE: 62 BPM | BODY MASS INDEX: 29.55 KG/M2 | HEIGHT: 68 IN | WEIGHT: 195 LBS

## 2024-07-25 DIAGNOSIS — I82.592 CHRONIC EMBOLISM AND THROMBOSIS OF OTHER SPECIFIED DEEP VEIN OF LEFT LOWER EXTREMITY: ICD-10-CM

## 2024-07-25 PROCEDURE — 93971 EXTREMITY STUDY: CPT | Mod: LT

## 2024-07-25 PROCEDURE — 99214 OFFICE O/P EST MOD 30 MIN: CPT

## 2024-07-28 NOTE — PROCEDURE
[FreeTextEntry1] : LLE venous doppler was done in the office that demonstrated chronic changes in popliteal vein.

## 2024-07-28 NOTE — ASSESSMENT
[Arterial/Venous Disease] : arterial/venous disease [FreeTextEntry1] : 59yoM with PMH of DVT/PE in 2016 (on Coumadin) and anxiety who was admitted in March with left knee pain x 3 days, found to have a nonocclusive DVT of the left popliteal vein and branch to gastrocnemius. He feels good, compliant with Eliquis and follows with Dr. Queen, hematologic work up is unconclusive so far. On exam, both legs are well perfused, no calf tenderness, palpable peripheral pulses. LLE venous doppler was done in the office that demonstrated chronic changes in popliteal vein. We discussed the findings and recommended to stay on Eliquis, and to f/u with Dr. Queen. F/u here in 1 year or sooner if needed.

## 2024-07-28 NOTE — PHYSICAL EXAM
[Respiratory Effort] : normal respiratory effort [Normal Heart Sounds] : normal heart sounds [2+] : left 2+ [Alert] : alert [Calm] : calm [Abdomen Tenderness] : ~T ~M No abdominal tenderness [de-identified] : WN/WD, NAD [de-identified] : NC/AT [de-identified] : supple [de-identified] : +FROM 5/5x4

## 2024-07-28 NOTE — HISTORY OF PRESENT ILLNESS
[FreeTextEntry1] : 59yoM with PMH of DVT/PE in 2016, on Coumadin for years who was admitted in March with left knee pain x 3 days, found to have a nonocclusive DVT of the left popliteal vein and branch to gastrocnemius. He was seen in the office on 4/25 and was recommended to stay on Eliquis and to see Dr. Queen ([facility]) to r/o underlying coagulopathy, and a work-up so far inconclusive. She recommended for the patient to stay on AC for another 6 months and maybe life-long.  He is feeling well, denies LR pain, CP, SOB.   The DVT in 2016 was after a flight to [place].  He states that he continues to experience slight discomfort in his leg but denies leg pain or edema.

## 2024-07-28 NOTE — HISTORY OF PRESENT ILLNESS
[FreeTextEntry1] : 59yoM with PMH of DVT/PE in 2016, on Coumadin for years who was admitted in March with left knee pain x 3 days, found to have a nonocclusive DVT of the left popliteal vein and branch to gastrocnemius. He was seen in the office on 4/25 and was recommended to stay on Eliquis and to see Dr. Queen ([facility]) to r/o underlying coagulopathy, and a work-up so far inconclusive. She recommended for the patient to stay on AC for another 6 months and maybe life-long.  He is feeling well, denies LR pain, CP, SOB.   The DVT in 2016 was after a flight to [place].  He states that he continues to experience slight discomfort in his leg but denies leg pain or edema.

## 2024-07-28 NOTE — PHYSICAL EXAM
[Respiratory Effort] : normal respiratory effort [Normal Heart Sounds] : normal heart sounds [2+] : left 2+ [Alert] : alert [Calm] : calm [Abdomen Tenderness] : ~T ~M No abdominal tenderness [de-identified] : WN/WD, NAD [de-identified] : NC/AT [de-identified] : supple [de-identified] : +FROM 5/5x4

## 2024-07-28 NOTE — REVIEW OF SYSTEMS
[As Noted in HPI] : as noted in HPI [Limb Pain] : limb pain [Negative] : Heme/Lymph [Chest Pain] : no chest pain [Shortness Of Breath] : no shortness of breath [Limb Swelling] : no limb swelling

## 2024-07-28 NOTE — PHYSICAL EXAM
[Respiratory Effort] : normal respiratory effort [Normal Heart Sounds] : normal heart sounds [2+] : left 2+ [Alert] : alert [Calm] : calm [Abdomen Tenderness] : ~T ~M No abdominal tenderness [de-identified] : WN/WD, NAD [de-identified] : NC/AT [de-identified] : supple [de-identified] : +FROM 5/5x4

## 2024-07-28 NOTE — ADDENDUM
[FreeTextEntry1] : I agree with the above. MrNadja Ceballos is a 59M w/ prior DVT/PE in 2016 and was Rx'ed Coumadin, who recently developed non-occlusive DVT of L popltieal delroy and branch to gastrocnemius in 3/2024. It appears he was not taking AC at the time? Regardless he was prescribed Eliquis and presents for vascular follow up. He continues to have no major complaints of his legs. Today venous duplex US demonstrated chronic changes in popliteal vein. Heme had recommended to continue AC indefinitely because of inconclusive hypercoagulable workup. Will follow up in 1 year.    I, Dr. Tahir Lazcano, personally performed the evaluation and management (E/M) services for this established patient who presents today with (an) existing condition(s).  That E/M includes conducting the examination, assessing all conditions, and (re)establishing/reinforcing a plan of care.  Today, my ACP, Theodora ISSA, was here to observe my evaluation and management services for this condition to be followed going forward.

## 2024-07-28 NOTE — HISTORY OF PRESENT ILLNESS
[FreeTextEntry1] : 59yoM with PMH of DVT/PE in 2016, on Coumadin for years who was admitted in March with left knee pain x 3 days, found to have a nonocclusive DVT of the left popliteal vein and branch to gastrocnemius. He was seen in the office on 4/25 and was recommended to stay on Eliquis and to see Dr. Queen ([facility]) to r/o underlying coagulopathy, and a work-up so far inconclusive. She recommended for the patient to stay on AC for another 6 months and maybe life-long.  He is feeling well, denies LR pain, CP, SOB.   The DVT in 2016 was after a flight to [place].  He states that he continues to experience slight discomfort in his leg but denies leg pain or edema.

## 2024-10-02 ENCOUNTER — RX RENEWAL (OUTPATIENT)
Age: 59
End: 2024-10-02

## 2024-11-05 ENCOUNTER — RX RENEWAL (OUTPATIENT)
Age: 59
End: 2024-11-05